# Patient Record
Sex: FEMALE | Race: OTHER | URBAN - METROPOLITAN AREA
[De-identification: names, ages, dates, MRNs, and addresses within clinical notes are randomized per-mention and may not be internally consistent; named-entity substitution may affect disease eponyms.]

---

## 2021-02-13 ENCOUNTER — OFFICE VISIT (OUTPATIENT)
Dept: URBAN - METROPOLITAN AREA TELEHEALTH 2 | Facility: TELEHEALTH | Age: 66
End: 2021-02-13
Payer: MEDICARE

## 2021-02-13 DIAGNOSIS — K59.09 CHRONIC CONSTIPATION: ICD-10-CM

## 2021-02-13 DIAGNOSIS — K21.9 GERD (GASTROESOPHAGEAL REFLUX DISEASE): ICD-10-CM

## 2021-02-13 DIAGNOSIS — R10.84 ABDOMINAL PAIN, RLQ: ICD-10-CM

## 2021-02-13 DIAGNOSIS — K76.0 NAFLD (NONALCOHOLIC FATTY LIVER DISEASE): ICD-10-CM

## 2021-02-13 PROCEDURE — G8422 PT INELIG BMI CALCULATION: HCPCS | Performed by: INTERNAL MEDICINE

## 2021-02-13 PROCEDURE — G8484 FLU IMMUNIZE NO ADMIN: HCPCS | Performed by: INTERNAL MEDICINE

## 2021-02-13 PROCEDURE — 99214 OFFICE O/P EST MOD 30 MIN: CPT | Performed by: INTERNAL MEDICINE

## 2021-02-13 PROCEDURE — 3017F COLORECTAL CA SCREEN DOC REV: CPT | Performed by: INTERNAL MEDICINE

## 2021-02-13 PROCEDURE — G9903 PT SCRN TBCO ID AS NON USER: HCPCS | Performed by: INTERNAL MEDICINE

## 2021-02-13 PROCEDURE — 1036F TOBACCO NON-USER: CPT | Performed by: INTERNAL MEDICINE

## 2021-02-13 PROCEDURE — G8427 DOCREV CUR MEDS BY ELIG CLIN: HCPCS | Performed by: INTERNAL MEDICINE

## 2021-02-13 PROCEDURE — 83013 H PYLORI (C-13) BREATH: CPT | Performed by: INTERNAL MEDICINE

## 2021-02-13 PROCEDURE — 83014 H PYLORI DRUG ADMIN: CPT | Performed by: INTERNAL MEDICINE

## 2021-02-13 RX ORDER — RIFAXIMIN 550 MG/1
TAKE 1 TABLET BY ORAL ROUTE DAILY FOR 5 DAYS TABLET ORAL 1
Qty: 5 | Refills: 0 | Status: ACTIVE | COMMUNITY
Start: 2017-11-03

## 2021-02-13 RX ORDER — DICYCLOMINE HYDROCHLORIDE 20 MG/1
1 TABLET TABLET ORAL THREE TIMES A DAY
Qty: 42 TABLET | Refills: 1 | OUTPATIENT
Start: 2021-02-13 | End: 2021-03-13

## 2021-02-13 NOTE — HPI-TODAY'S VISIT:
64 yo pt w/ 1 week of RLQ severe abdominal pain, w/ radiation to the anterior thigh and across the lower abdomen, w/ concomitant abdominal distention and fullness; at times pain radiates to the lower back. Has normal, formed bm's w/o bleeding once a day. No fever or chills, No melenic stools and no hematochezia. Denies urologic sxs. She has decreased her po intake and has lost some weight: 124 lbs _ _-> 107 today at home.  Her pain is exacerbated by decubitus, and eating, w/ bloating, epigastric fullnees and mild abdominal distention w/ occasional nausea and no emesis.  No COVID-19 exposure. Colonoscoopy 2015: redundant colon w/ spasm and E- Hrrds. EGD 10/12: NERD, sm HH and Hp-negative gastritis.

## 2021-02-15 ENCOUNTER — TELEPHONE ENCOUNTER (OUTPATIENT)
Dept: URBAN - METROPOLITAN AREA SURGERY CENTER 30 | Facility: SURGERY CENTER | Age: 66
End: 2021-02-15

## 2021-02-16 ENCOUNTER — LAB OUTSIDE AN ENCOUNTER (OUTPATIENT)
Dept: URBAN - METROPOLITAN AREA CLINIC 98 | Facility: CLINIC | Age: 66
End: 2021-02-16

## 2021-02-18 LAB
A/G RATIO: 1.7
ALBUMIN: 4.7
ALKALINE PHOSPHATASE: 84
ALPHA 2-MACROGLOBULINS, QN: 180
ALT (SGPT) P5P: 14
ALT (SGPT): 12
APOLIPOPROTEIN A-1: 192
AST (SGOT) P5P: 22
AST (SGOT): 20
BILIRUBIN, TOTAL: 0.3
BILIRUBIN, TOTAL: 0.3
BUN/CREATININE RATIO: 16
BUN: 12
CALCIUM: 10.5
CARBON DIOXIDE, TOTAL: 24
CHLORIDE: 100
CHOLESTEROL, TOTAL: 201
COMMENT:: (no result)
CREATININE: 0.74
EGFR IF AFRICN AM: 100
EGFR IF NONAFRICN AM: 86
FIBROSIS SCORE: 0.05
FIBROSIS SCORING:: (no result)
FIBROSIS STAGE: (no result)
GGT: 8
GLOBULIN, TOTAL: 2.7
GLUCOSE, SERUM: 100
GLUCOSE: 96
H PYLORI BREATH TEST: NEGATIVE
HAPTOGLOBIN: 149
HEIGHT:: 59
HEMATOCRIT: 42.5
HEMOGLOBIN: 13.6
INTERPRETATIONS:: (no result)
LIMITATIONS:: (no result)
LIPASE: 37
MCH: 27.6
MCHC: 32
MCV: 86
NASH GRADE: (no result)
NASH SCORE: 0.25
NASH SCORING: (no result)
NRBC: (no result)
PLATELETS: 392
POTASSIUM: 4.3
PROTEIN, TOTAL: 7.4
RBC: 4.92
RDW: 12.6
SODIUM: 142
STEATOSIS GRADE: (no result)
STEATOSIS GRADING: (no result)
STEATOSIS SCORE: 0.12
T4,FREE(DIRECT): 1.55
TRIGLYCERIDES: 98
TSH: 2.45
WBC: 6.3
WEIGHT:: 107

## 2021-03-22 ENCOUNTER — TELEPHONE ENCOUNTER (OUTPATIENT)
Dept: URBAN - METROPOLITAN AREA CLINIC 98 | Facility: CLINIC | Age: 66
End: 2021-03-22

## 2021-04-08 ENCOUNTER — TELEPHONE ENCOUNTER (OUTPATIENT)
Dept: URBAN - METROPOLITAN AREA CLINIC 92 | Facility: CLINIC | Age: 66
End: 2021-04-08

## 2021-04-08 ENCOUNTER — OFFICE VISIT (OUTPATIENT)
Dept: URBAN - METROPOLITAN AREA SURGERY CENTER 18 | Facility: SURGERY CENTER | Age: 66
End: 2021-04-08
Payer: MEDICARE

## 2021-04-08 DIAGNOSIS — Z12.11 COLON CANCER SCREENING: ICD-10-CM

## 2021-04-08 PROCEDURE — 45378 DIAGNOSTIC COLONOSCOPY: CPT | Performed by: INTERNAL MEDICINE

## 2021-04-08 PROCEDURE — G8907 PT DOC NO EVENTS ON DISCHARG: HCPCS | Performed by: INTERNAL MEDICINE

## 2021-04-08 RX ORDER — RIFAXIMIN 550 MG/1
TAKE 1 TABLET BY ORAL ROUTE DAILY FOR 5 DAYS TABLET ORAL 1
Qty: 5 | Refills: 0 | Status: ACTIVE | COMMUNITY
Start: 2017-11-03

## 2021-04-08 RX ORDER — LINACLOTIDE 72 UG/1
1 CAPSULE AT LEAST 30 MINUTES BEFORE THE FIRST MEAL OF THE DAY ON AN EMPTY STOMACH CAPSULE, GELATIN COATED ORAL ONCE A DAY
Qty: 30 | Refills: 1 | OUTPATIENT
Start: 2021-04-08 | End: 2021-06-07

## 2021-10-09 ENCOUNTER — OFFICE VISIT (OUTPATIENT)
Dept: URBAN - METROPOLITAN AREA TELEHEALTH 2 | Facility: TELEHEALTH | Age: 66
End: 2021-10-09
Payer: MEDICARE

## 2021-10-09 DIAGNOSIS — R74.8 ELEVATED AMYLASE: ICD-10-CM

## 2021-10-09 DIAGNOSIS — K21.9 GERD (GASTROESOPHAGEAL REFLUX DISEASE): ICD-10-CM

## 2021-10-09 DIAGNOSIS — K58.1 IRRITABLE BOWEL SYNDROME WITH CONSTIPATION: ICD-10-CM

## 2021-10-09 DIAGNOSIS — K76.0 NAFLD (NONALCOHOLIC FATTY LIVER DISEASE): ICD-10-CM

## 2021-10-09 PROCEDURE — 99214 OFFICE O/P EST MOD 30 MIN: CPT | Performed by: INTERNAL MEDICINE

## 2021-10-09 RX ORDER — LINACLOTIDE 72 UG/1
1 CAPSULE AT LEAST 30 MINUTES BEFORE THE FIRST MEAL OF THE DAY ON AN EMPTY STOMACH CAPSULE, GELATIN COATED ORAL ONCE A DAY
Qty: 30 | Refills: 3 | OUTPATIENT
Start: 2021-10-11 | End: 2022-02-07

## 2021-10-09 RX ORDER — RIFAXIMIN 550 MG/1
TAKE 1 TABLET BY ORAL ROUTE DAILY FOR 5 DAYS TABLET ORAL 1
Qty: 5 | Refills: 0 | Status: ACTIVE | COMMUNITY
Start: 2017-11-03

## 2021-10-09 RX ORDER — DICYCLOMINE HYDROCHLORIDE 20 MG/1
1 TABLET TABLET ORAL TWICE A DAY
Qty: 60 | Refills: 3 | OUTPATIENT
Start: 2021-10-11 | End: 2022-02-07

## 2021-10-09 NOTE — HPI-TODAY'S VISIT:
65 yo pt w/ upper colicky abdominal pain, w/ radiation to  the lower abdomen, w/ concomitant abdominal distention and fullness; at times pain radiates to the lower back. Has been somewhat constipated: soft, @ times hard and small bm's q 7, bms' q 3 days. Today, she had 2 bm's and has much less abdominal discomfort and bloating. Because of this abdominal pain, she was seen by her PCP: labs w slight elevation of amylase; A + P CT scan: normal. She has occasional nausea w/o emesis. No fever or chills, No melenic stools and no hematochezia. Denies urologic sxs.   Her pain is exacerbated by decubitus, and eating, w/ bloating, epigastric fullnees and mild abdominal distention as noted, but improved after bm's. Colonoscoopy 4/21: redundant colon w/ spasm and E- Hrrds. EGD 10/12: NERD, sm HH and Hp-negative gastritis. A + P CT 2/21: normal. No COVID-19 exposure and has received 2 doses of COVID-19 vaccine. No other complaints.

## 2021-10-11 PROBLEM — 89538001 HELICOBACTER-ASSOCIATED GASTRITIS: Status: ACTIVE | Noted: 2021-02-13

## 2021-12-19 NOTE — PHYSICAL EXAM SKIN:
no rashes , no suspicious lesions , no areas of discoloration
Alert and oriented to person, place, time/situation. normal mood and affect. no apparent risk to self or others.

## 2022-06-04 ENCOUNTER — OFFICE VISIT (OUTPATIENT)
Dept: URBAN - METROPOLITAN AREA TELEHEALTH 2 | Facility: TELEHEALTH | Age: 67
End: 2022-06-04
Payer: MEDICARE

## 2022-06-04 DIAGNOSIS — R13.14 PHARYNGOESOPHAGEAL DYSPHAGIA: ICD-10-CM

## 2022-06-04 DIAGNOSIS — K21.9 GERD (GASTROESOPHAGEAL REFLUX DISEASE): ICD-10-CM

## 2022-06-04 DIAGNOSIS — K58.1 IRRITABLE BOWEL SYNDROME WITH CONSTIPATION: ICD-10-CM

## 2022-06-04 DIAGNOSIS — K76.0 NAFLD (NONALCOHOLIC FATTY LIVER DISEASE): ICD-10-CM

## 2022-06-04 DIAGNOSIS — K59.09 CHRONIC CONSTIPATION: ICD-10-CM

## 2022-06-04 PROCEDURE — 99214 OFFICE O/P EST MOD 30 MIN: CPT | Performed by: INTERNAL MEDICINE

## 2022-06-04 RX ORDER — RIFAXIMIN 550 MG/1
TAKE 1 TABLET BY ORAL ROUTE DAILY FOR 5 DAYS TABLET ORAL 1
Qty: 5 | Refills: 0 | Status: ACTIVE | COMMUNITY
Start: 2017-11-03

## 2022-06-04 NOTE — HPI-TODAY'S VISIT:
This is a Telehealth OV to which patient has agreed to. Limitations of telehealth discussed; she understands and agrees to proceed. Patient's @ home during this virtual OV. 68 yo pt  for constipation follow up. Has been less constipated: soft, and small bm's  with prn Linzess and  high fiber diet.  Has been complaining of  hiccups and intermittent dysphagia to solids wo food impaction. Previous  A + P CT scan: normal. She has occasional nausea w/o emesis. No fever or chills, No melenic stools and no hematochezia. Denies urologic sxs.  Colonoscoopy 4/21: redundant colon w/ spasm and E- Hrrds. EGD 10/12: NERD, sm HH and Hp-negative gastritis. A + P CT 2/21: normal. No COVID-19 exposure and has received 2 doses of COVID-19 vaccine. No other complaints.

## 2022-06-06 PROBLEM — 236069009 CHRONIC CONSTIPATION: Status: ACTIVE | Noted: 2021-02-13

## 2022-06-06 PROBLEM — 235595009 GASTROESOPHAGEAL REFLUX DISEASE: Status: ACTIVE | Noted: 2021-02-13

## 2022-06-06 PROBLEM — 440630006: Status: ACTIVE | Noted: 2021-10-11

## 2022-06-06 PROBLEM — 197315008 NAFLD - NONALCOHOLIC FATTY LIVER DISEASE: Status: ACTIVE | Noted: 2021-02-13

## 2022-06-22 ENCOUNTER — LAB OUTSIDE AN ENCOUNTER (OUTPATIENT)
Dept: URBAN - METROPOLITAN AREA CLINIC 98 | Facility: CLINIC | Age: 67
End: 2022-06-22

## 2022-08-17 PROBLEM — 40739000: Status: ACTIVE | Noted: 2022-06-06

## 2022-10-31 ENCOUNTER — OFFICE VISIT (OUTPATIENT)
Dept: URBAN - METROPOLITAN AREA MEDICAL CENTER 28 | Facility: MEDICAL CENTER | Age: 67
End: 2022-10-31

## 2023-05-16 ENCOUNTER — OFFICE VISIT (OUTPATIENT)
Dept: URBAN - METROPOLITAN AREA CLINIC 98 | Facility: CLINIC | Age: 68
End: 2023-05-16
Payer: MEDICARE

## 2023-05-16 VITALS
TEMPERATURE: 98.6 F | SYSTOLIC BLOOD PRESSURE: 110 MMHG | BODY MASS INDEX: 22.5 KG/M2 | DIASTOLIC BLOOD PRESSURE: 64 MMHG | HEART RATE: 70 BPM | WEIGHT: 111.6 LBS | HEIGHT: 59 IN

## 2023-05-16 DIAGNOSIS — K76.0 NAFLD (NONALCOHOLIC FATTY LIVER DISEASE): ICD-10-CM

## 2023-05-16 DIAGNOSIS — K21.9 GERD (GASTROESOPHAGEAL REFLUX DISEASE): ICD-10-CM

## 2023-05-16 DIAGNOSIS — K58.1 IRRITABLE BOWEL SYNDROME WITH CONSTIPATION: ICD-10-CM

## 2023-05-16 PROCEDURE — 99214 OFFICE O/P EST MOD 30 MIN: CPT | Performed by: INTERNAL MEDICINE

## 2023-05-16 RX ORDER — RIFAXIMIN 550 MG/1
TAKE 1 TABLET BY ORAL ROUTE DAILY FOR 5 DAYS TABLET ORAL 1
Qty: 5 | Refills: 0 | Status: ACTIVE | COMMUNITY
Start: 2017-11-03

## 2023-05-16 NOTE — HPI-TODAY'S VISIT:
68 yo pt  for constipation follow up. She has been c/o abdominal bloating, gas, upper abdominal distention w burping and flatus. Sxs worst @ night. Intermittent cramping abdominal pain. She hs bm's q 3 to 4 days, formed, hard and w +++ straining at stools. She has feeling of incomplete evacuation. No melenic stools and no bleeding. No anorexia or weight loss. Normal CPE a yr ago. Linzess has caused cramping abdominal pain.  Previous  A + P CT scan: normal. She has occasional nausea w/o emesis. No fever or chills, Denies urologic sxs. She has a good appetite and no weight loss.  Colonoscopy 4/21: redundant colon w/ spasm and E- Hrrds. EGD 10/12: NERD, sm HH and Hp-negative gastritis. A + P CT 2/21: normal. No COVID-19 exposure and has received 2 doses of COVID-19 vaccine. No other complaints.

## 2023-05-25 ENCOUNTER — TELEPHONE ENCOUNTER (OUTPATIENT)
Dept: URBAN - METROPOLITAN AREA CLINIC 98 | Facility: CLINIC | Age: 68
End: 2023-05-25

## 2023-06-29 ENCOUNTER — TELEPHONE ENCOUNTER (OUTPATIENT)
Dept: URBAN - METROPOLITAN AREA CLINIC 98 | Facility: CLINIC | Age: 68
End: 2023-06-29

## 2023-06-30 ENCOUNTER — TELEPHONE ENCOUNTER (OUTPATIENT)
Dept: URBAN - METROPOLITAN AREA CLINIC 98 | Facility: CLINIC | Age: 68
End: 2023-06-30

## 2024-05-08 ENCOUNTER — TELEPHONE ENCOUNTER (OUTPATIENT)
Dept: URBAN - METROPOLITAN AREA CLINIC 98 | Facility: CLINIC | Age: 69
End: 2024-05-08

## 2024-05-08 ENCOUNTER — DASHBOARD ENCOUNTERS (OUTPATIENT)
Age: 69
End: 2024-05-08

## 2024-05-08 ENCOUNTER — LAB OUTSIDE AN ENCOUNTER (OUTPATIENT)
Dept: URBAN - METROPOLITAN AREA CLINIC 98 | Facility: CLINIC | Age: 69
End: 2024-05-08

## 2024-05-08 ENCOUNTER — OFFICE VISIT (OUTPATIENT)
Dept: URBAN - METROPOLITAN AREA CLINIC 98 | Facility: CLINIC | Age: 69
End: 2024-05-08
Payer: MEDICARE

## 2024-05-08 VITALS
WEIGHT: 109.2 LBS | TEMPERATURE: 97.1 F | SYSTOLIC BLOOD PRESSURE: 129 MMHG | BODY MASS INDEX: 22.01 KG/M2 | HEART RATE: 46 BPM | DIASTOLIC BLOOD PRESSURE: 81 MMHG | HEIGHT: 59 IN

## 2024-05-08 DIAGNOSIS — K58.1 IRRITABLE BOWEL SYNDROME WITH CONSTIPATION: ICD-10-CM

## 2024-05-08 DIAGNOSIS — K59.09 CHRONIC CONSTIPATION: ICD-10-CM

## 2024-05-08 DIAGNOSIS — R10.84 GENERALIZED ABDOMINAL PAIN: ICD-10-CM

## 2024-05-08 DIAGNOSIS — K76.0 NAFLD (NONALCOHOLIC FATTY LIVER DISEASE): ICD-10-CM

## 2024-05-08 PROCEDURE — 99214 OFFICE O/P EST MOD 30 MIN: CPT | Performed by: INTERNAL MEDICINE

## 2024-05-08 RX ORDER — RIFAXIMIN 550 MG/1
TAKE 1 TABLET BY ORAL ROUTE DAILY FOR 5 DAYS TABLET ORAL 1
Qty: 5 | Refills: 0 | Status: ACTIVE | COMMUNITY
Start: 2017-11-03

## 2024-05-08 RX ORDER — PLECANATIDE 3 MG/1
1 TABLET TABLET ORAL ONCE A DAY
Qty: 30 | Refills: 3 | OUTPATIENT
Start: 2024-05-08 | End: 2024-09-05

## 2024-05-09 LAB
IMMUNOGLOBULIN A: 196
INTERPRETATION: (no result)
TISSUE TRANSGLUTAMINASE AB, IGA: <1

## 2024-05-18 LAB
CALPROTECTIN, FECAL: <5
OVA AND PARASITES, CONC AND PERM SMEAR: (no result)
PANCREATIC ELASTASE, FECAL: 386

## 2024-07-23 ENCOUNTER — WEB ENCOUNTER (OUTPATIENT)
Dept: URBAN - METROPOLITAN AREA CLINIC 98 | Facility: CLINIC | Age: 69
End: 2024-07-23

## 2024-08-01 ENCOUNTER — LAB OUTSIDE AN ENCOUNTER (OUTPATIENT)
Dept: URBAN - METROPOLITAN AREA CLINIC 98 | Facility: CLINIC | Age: 69
End: 2024-08-01

## 2024-08-12 ENCOUNTER — OFFICE VISIT (OUTPATIENT)
Dept: URBAN - METROPOLITAN AREA TELEHEALTH 2 | Facility: TELEHEALTH | Age: 69
End: 2024-08-12

## 2024-09-30 ENCOUNTER — TELEPHONE ENCOUNTER (OUTPATIENT)
Dept: URBAN - METROPOLITAN AREA CLINIC 98 | Facility: CLINIC | Age: 69
End: 2024-09-30

## 2024-10-15 ENCOUNTER — OFFICE VISIT (OUTPATIENT)
Dept: URBAN - METROPOLITAN AREA TELEHEALTH 2 | Facility: TELEHEALTH | Age: 69
End: 2024-10-15
Payer: COMMERCIAL

## 2024-10-15 DIAGNOSIS — R10.84 GENERALIZED ABDOMINAL PAIN: ICD-10-CM

## 2024-10-15 PROCEDURE — 99214 OFFICE O/P EST MOD 30 MIN: CPT | Performed by: INTERNAL MEDICINE

## 2024-10-15 RX ORDER — RIFAXIMIN 550 MG/1
TAKE 1 TABLET BY ORAL ROUTE DAILY FOR 5 DAYS TABLET ORAL 1
Qty: 5 | Refills: 0 | Status: ACTIVE | COMMUNITY
Start: 2017-11-03

## 2024-10-15 NOTE — HPI-TODAY'S VISIT:
This is a Telehealth OV to which patient has agreed to. Limitations of TH discussed; patient understands and agrees to proceed. Pt's at home during this virtual OV. 70 yo pt w chronic constipation follow up of diffuse abdominal pain. She has been feeling much better lately, w much less abdominal pain. Labs 5/24: normal FCAL, Princess, elastase and stool O & P. A + P CT scan 8/24: normal x for a known and stable pulmonary nodule. She has less abdominal bloating, gas, distention and no cramping lower abdominal pain. She has bm's q 3 to 4 days, formed, stools, w mucus  and no melena nor hematochezia. No anorexia and some weight loss w diet changes and exercise. Good appetite, Has been traveling, most recently to [facility], .  Normal CPE 11/23 aside from borderline BG and HLD.. Constipation controlled w  Trulance qd. No fever or chills, Denies urologic sxs. Colonoscopy 4/21: redundant colon w/ spasm and E- Hrrds. EGD 10/12: NERD, sm HH and Hp-negative gastritis. A + P CT 2/21: normal. No COVID-19 exposure and has received 2 doses of COVID-19 vaccine. No other complaints.

## 2024-10-24 ENCOUNTER — TELEPHONE ENCOUNTER (OUTPATIENT)
Dept: URBAN - METROPOLITAN AREA CLINIC 98 | Facility: CLINIC | Age: 69
End: 2024-10-24